# Patient Record
Sex: MALE | Race: WHITE | Employment: FULL TIME | URBAN - METROPOLITAN AREA
[De-identification: names, ages, dates, MRNs, and addresses within clinical notes are randomized per-mention and may not be internally consistent; named-entity substitution may affect disease eponyms.]

---

## 2020-01-02 ENCOUNTER — THERAPY VISIT (OUTPATIENT)
Dept: PHYSICAL THERAPY | Facility: CLINIC | Age: 60
End: 2020-01-02
Payer: COMMERCIAL

## 2020-01-02 DIAGNOSIS — M25.551 RIGHT HIP PAIN: ICD-10-CM

## 2020-01-02 DIAGNOSIS — Z47.89 AFTERCARE FOLLOWING SURGERY OF THE MUSCULOSKELETAL SYSTEM: ICD-10-CM

## 2020-01-02 PROCEDURE — 97112 NEUROMUSCULAR REEDUCATION: CPT | Mod: GP | Performed by: PHYSICAL THERAPIST

## 2020-01-02 PROCEDURE — 97110 THERAPEUTIC EXERCISES: CPT | Mod: GP | Performed by: PHYSICAL THERAPIST

## 2020-01-02 PROCEDURE — 97161 PT EVAL LOW COMPLEX 20 MIN: CPT | Mod: GP | Performed by: PHYSICAL THERAPIST

## 2020-01-02 ASSESSMENT — ACTIVITIES OF DAILY LIVING (ADL)
WALKING_DOWN_STEEP_HILLS: EXTREME DIFFICULTY
RECREATIONAL_ACTIVITIES: UNABLE TO DO
DEEP_SQUATTING: UNABLE TO DO
WALKING_APPROXIMATELY_10_MINUTES: MODERATE DIFFICULTY
SITTING_FOR_15_MINUTES: NO DIFFICULTY AT ALL
HOW_WOULD_YOU_RATE_YOUR_CURRENT_LEVEL_OF_FUNCTION_DURING_YOUR_USUAL_ACTIVITIES_OF_DAILY_LIVING_FROM_0_TO_100_WITH_100_BEING_YOUR_LEVEL_OF_FUNCTION_PRIOR_TO_YOUR_HIP_PROBLEM_AND_0_BEING_THE_INABILITY_TO_PERFORM_ANY_OF_YOUR_USUAL_DAILY_ACTIVITIES?: 25
ROLLING_OVER_IN_BED: UNABLE TO DO
HOS_ADL_ITEM_SCORE_TOTAL: 25
HEAVY_WORK: UNABLE TO DO
WALKING_UP_STEEP_HILLS: EXTREME DIFFICULTY
GOING_UP_1_FLIGHT_OF_STAIRS: SLIGHT DIFFICULTY
HOS_ADL_HIGHEST_POTENTIAL_SCORE: 64
WALKING_15_MINUTES_OR_GREATER: MODERATE DIFFICULTY
PUTTING_ON_SOCKS_AND_SHOES: UNABLE TO DO
LIGHT_TO_MODERATE_WORK: MODERATE DIFFICULTY
STANDING_FOR_15_MINUTES: MODERATE DIFFICULTY
WALKING_INITIALLY: SLIGHT DIFFICULTY
TWISTING/PIVOTING_ON_INVOLVED_LEG: UNABLE TO DO
GETTING_INTO_AND_OUT_OF_AN_AVERAGE_CAR: SLIGHT DIFFICULTY
HOS_ADL_COUNT: 16
HOS_ADL_SCORE(%): 39.06
GOING_DOWN_1_FLIGHT_OF_STAIRS: SLIGHT DIFFICULTY
STEPPING_UP_AND_DOWN_CURBS: SLIGHT DIFFICULTY

## 2020-01-02 NOTE — LETTER
[facility] [place] [facility]  [address] [place] [address]  [phone]    2020    Re: Gokul Gurrola   :   1960  MRN:  [id number]   REFERRING PHYSICIAN:   Santana Summers    [facility] [place] [facility]    Date of Initial Evaluation:  20  Visits:  Rxs Used: 1  Reason for Referral:     Right hip pain  Aftercare following surgery of the musculoskeletal system    EVALUATION SUMMARY    [facility] Initial Evaluation  Subjective:  The history is provided by the patient. No  was used.   Type of problem:  Right hip   Condition occurred with:  Degenerative joint disease. This is a new condition   Problem details: S/p THR on 2019.  No complications.  Patient c/o impaired ambulation on levels and stairs.  Transfers are also impaired.  Patient is not driving.  He is a .  Left hip was replaced 6 years ago.   Patient reports pain:  Lateral and groin. Radiates to:  No radiation. Associated symptoms:  Loss of strength. Symptoms are exacerbated by ascending stairs, descending stairs, transfers and walking and relieved by activity/movement and analgesics.  and reported as 1/10 on pain scale. General health as reported by patient is poor. Pertinent medical history includes:  High blood pressure, overweight, numbness/tingling, osteoarthritis and sleep disorder/apnea.  Medical allergies: none.  Surgeries include:  Orthopedic surgery.  Current medications:  Anti-inflammatory and pain medication.   Primary job tasks include:  Prolonged sitting.  Pain is described as aching and is constant. Pain is the same all the time. Since onset symptoms are gradually improving.  Previous treatment includes physical therapy (in hospital 2-3 sessions). There was mild improvement following previous treatment.   Patient is . Restrictions include:  Working in normal job without  restrictions.  Barriers include:  None as reported by patient.  Red flags:  None as reported by patient.  Objective:  Gait:    Gait Type:  Antalgic   Assistive Devices:  Cane  Deviations:  Hip:  Excessive flexion R  Hip Evaluation  HIP AROM:  AROM:   Left Hip:        Right Hip:   Not assessed  Hip PROM:  Hip PROM:  Left Hip:    Right Hip:  Not assessed  Re: Gokul Gurrola   :   1960      Hip Strength:    Flexion:   Right: 3/5   Pain:  Extension:  Right: 3+/5    Pain:    Abduction:  Right: 3-/5    Pain:  Adduction:  Right: 4/5   Pain:  Knee Flexion:  Right: 5-/5   Pain:  Knee Extension:  Right: 5-/5    Pain:  Assessment/Plan:    Patient is a 59 year old male with right side hip complaints.    Patient has the following significant findings with corresponding treatment plan.                Diagnosis 1:  S/p R THR  Pain -  self management, education and home program  Decreased strength - therapeutic exercise, therapeutic activities and home program  Impaired gait - gait training and home program  Impaired muscle performance - neuro re-education and home program    Therapy Evaluation Codes:   1) History comprised of:   Personal factors that impact the plan of care:      None.    Comorbidity factors that impact the plan of care are:      High blood pressure, Osteoarthritis and Overweight.     Medications impacting care: Anti-inflammatory and Pain.  2) Examination of Body Systems comprised of:   Body structures and functions that impact the plan of care:      Hip.   Activity limitations that impact the plan of care are:      Bathing, Bending, Driving, Dressing, Stairs, Standing, Walking and Working.  3) Clinical presentation characteristics are:   Stable/Uncomplicated.  4) Decision-Making    Low complexity using standardized patient assessment instrument and/or measureable assessment of functional outcome.  Cumulative Therapy Evaluation is: Low complexity.  Previous and current functional limitations:  (See Goal  Flow Sheet for this information)    Short term and Long term goals: (See Goal Flow Sheet for this information)   Communication ability:  Patient appears to be able to clearly communicate and understand verbal and written communication and follow directions correctly.  Treatment Explanation - The following has been discussed with the patient:   RX ordered/plan of care  Anticipated outcomes                    Re: Gokul SAAB Gurrola   :   1960          Possible risks and side effects  This patient would benefit from PT intervention to resume normal activities.   Rehab potential is good.  Frequency:  2 X week, once daily  Duration:  for 8 weeks  Discharge Plan:  Achieve all LTG.  Independent in home treatment program.  Reach maximal therapeutic benefit.        Thank you for your referral.    INQUIRIES  Therapist: Antwan Patel PT  [facility]  [address] [place] [address]  Phone: [phone]  Fax: [phone]

## 2020-01-02 NOTE — PROGRESS NOTES
[facility] Initial Evaluation  Subjective:  The history is provided by the patient. No  was used.   Type of problem:  Right hip   Condition occurred with:  Degenerative joint disease. This is a new condition   Problem details: S/p THR on 12/23/2019.  No complications.  Patient c/o impaired ambulation on levels and stairs.  Transfers are also impaired.  Patient is not driving.  He is a .  Left hip was replaced 6 years ago.   Patient reports pain:  Lateral and groin. Radiates to:  No radiation. Associated symptoms:  Loss of strength. Symptoms are exacerbated by ascending stairs, descending stairs, transfers and walking and relieved by activity/movement and analgesics.      and reported as 1/10 on pain scale. General health as reported by patient is poor. Pertinent medical history includes:  High blood pressure, overweight, numbness/tingling, osteoarthritis and sleep disorder/apnea.  Medical allergies: none.  Surgeries include:  Orthopedic surgery.  Current medications:  Anti-inflammatory and pain medication.   Primary job tasks include:  Prolonged sitting.  Pain is described as aching and is constant. Pain is the same all the time. Since onset symptoms are gradually improving.  Previous treatment includes physical therapy (in hospital 2-3 sessions). There was mild improvement following previous treatment.   Patient is . Restrictions include:  Working in normal job without restrictions.    Barriers include:  None as reported by patient.  Red flags:  None as reported by patient.                      Objective:    Gait:    Gait Type:  Antalgic   Assistive Devices:  Cane  Deviations:  Hip:  Excessive flexion R                                                 Hip Evaluation  HIP AROM:  AROM:   Left Hip:        Right Hip:   Not assessed                  Hip PROM:  Hip PROM:  Left Hip:    Right Hip:  Not assessed                          Hip Strength:     Flexion:   Right: 3/5   Pain:                    Extension:  Right: 3+/5    Pain:    Abduction:  Right: 3-/5    Pain:  Adduction:  Right: 4/5   Pain:      Knee Flexion:  Right: 5-/5   Pain:  Knee Extension:  Right: 5-/5    Pain:                       General     ROS    Assessment/Plan:    Patient is a 59 year old male with right side hip complaints.    Patient has the following significant findings with corresponding treatment plan.                Diagnosis 1:  S/p R THR  Pain -  self management, education and home program  Decreased strength - therapeutic exercise, therapeutic activities and home program  Impaired gait - gait training and home program  Impaired muscle performance - neuro re-education and home program  Decreased function - therapeutic activities and home program    Therapy Evaluation Codes:   1) History comprised of:   Personal factors that impact the plan of care:      None.    Comorbidity factors that impact the plan of care are:      High blood pressure, Osteoarthritis and Overweight.     Medications impacting care: Anti-inflammatory and Pain.  2) Examination of Body Systems comprised of:   Body structures and functions that impact the plan of care:      Hip.   Activity limitations that impact the plan of care are:      Bathing, Bending, Driving, Dressing, Stairs, Standing, Walking and Working.  3) Clinical presentation characteristics are:   Stable/Uncomplicated.  4) Decision-Making    Low complexity using standardized patient assessment instrument and/or measureable assessment of functional outcome.  Cumulative Therapy Evaluation is: Low complexity.    Previous and current functional limitations:  (See Goal Flow Sheet for this information)    Short term and Long term goals: (See Goal Flow Sheet for this information)     Communication ability:  Patient appears to be able to clearly communicate and understand verbal and written communication and follow directions correctly.  Treatment  Explanation - The following has been discussed with the patient:   RX ordered/plan of care  Anticipated outcomes  Possible risks and side effects  This patient would benefit from PT intervention to resume normal activities.   Rehab potential is good.    Frequency:  2 X week, once daily  Duration:  for 8 weeks  Discharge Plan:  Achieve all LTG.  Independent in home treatment program.  Reach maximal therapeutic benefit.    Please refer to the daily flowsheet for treatment today, total treatment time and time spent performing 1:1 timed codes.

## 2020-03-26 PROBLEM — M25.551 RIGHT HIP PAIN: Status: RESOLVED | Noted: 2020-01-02 | Resolved: 2020-03-26

## 2020-03-26 PROBLEM — Z47.89 AFTERCARE FOLLOWING SURGERY OF THE MUSCULOSKELETAL SYSTEM: Status: RESOLVED | Noted: 2020-01-02 | Resolved: 2020-03-26
